# Patient Record
Sex: MALE | Race: WHITE | NOT HISPANIC OR LATINO
[De-identification: names, ages, dates, MRNs, and addresses within clinical notes are randomized per-mention and may not be internally consistent; named-entity substitution may affect disease eponyms.]

---

## 2019-01-09 ENCOUNTER — TRANSCRIBE ORDERS (OUTPATIENT)
Dept: SCHEDULING | Age: 64
End: 2019-01-09

## 2019-01-09 DIAGNOSIS — N18.30 CHRONIC KIDNEY DISEASE, STAGE III (MODERATE) (CMS/HCC): Primary | ICD-10-CM

## 2019-01-09 DIAGNOSIS — R35.0 FREQUENCY OF MICTURITION: ICD-10-CM

## 2019-01-10 ENCOUNTER — HOSPITAL ENCOUNTER (OUTPATIENT)
Dept: RADIOLOGY | Facility: CLINIC | Age: 64
Discharge: HOME | End: 2019-01-10
Attending: INTERNAL MEDICINE
Payer: COMMERCIAL

## 2019-01-10 DIAGNOSIS — N18.30 CHRONIC KIDNEY DISEASE, STAGE III (MODERATE) (CMS/HCC): ICD-10-CM

## 2019-01-10 DIAGNOSIS — R35.0 FREQUENCY OF MICTURITION: ICD-10-CM

## 2019-01-10 PROCEDURE — 76775 US EXAM ABDO BACK WALL LIM: CPT

## 2019-02-11 ENCOUNTER — TRANSCRIBE ORDERS (OUTPATIENT)
Dept: SCHEDULING | Age: 64
End: 2019-02-11

## 2019-02-11 DIAGNOSIS — N13.30 HYDRONEPHROSIS: Primary | ICD-10-CM

## 2019-02-25 ENCOUNTER — HOSPITAL ENCOUNTER (OUTPATIENT)
Dept: RADIOLOGY | Facility: CLINIC | Age: 64
Discharge: HOME | End: 2019-02-25
Attending: PHYSICIAN ASSISTANT
Payer: COMMERCIAL

## 2019-02-25 DIAGNOSIS — N13.30 HYDRONEPHROSIS: ICD-10-CM

## 2019-02-25 PROCEDURE — 76770 US EXAM ABDO BACK WALL COMP: CPT

## 2019-04-04 RX ORDER — ROSUVASTATIN CALCIUM 5 MG/1
5 TABLET, COATED ORAL NIGHTLY
COMMUNITY
End: 2019-04-04 | Stop reason: ENTERED-IN-ERROR

## 2019-04-04 RX ORDER — AMITRIPTYLINE HYDROCHLORIDE 50 MG/1
50 TABLET, FILM COATED ORAL NIGHTLY
COMMUNITY
End: 2019-04-04 | Stop reason: ENTERED-IN-ERROR

## 2019-04-04 RX ORDER — CYCLOBENZAPRINE HCL 10 MG
10 TABLET ORAL 3 TIMES DAILY PRN
COMMUNITY
End: 2019-04-04 | Stop reason: ENTERED-IN-ERROR

## 2019-04-04 RX ORDER — ALPRAZOLAM 0.25 MG/1
0.25 TABLET ORAL NIGHTLY PRN
COMMUNITY
End: 2019-04-04 | Stop reason: ENTERED-IN-ERROR

## 2019-04-04 RX ORDER — ACETAMINOPHEN 500 MG
2000 TABLET ORAL DAILY
COMMUNITY
End: 2019-04-04 | Stop reason: ENTERED-IN-ERROR

## 2019-04-04 RX ORDER — MONTELUKAST SODIUM 10 MG/1
TABLET ORAL NIGHTLY
COMMUNITY
End: 2019-04-04 | Stop reason: ENTERED-IN-ERROR

## 2019-04-09 ENCOUNTER — ANESTHESIA EVENT (OUTPATIENT)
Dept: OPERATING ROOM | Facility: HOSPITAL | Age: 64
Setting detail: HOSPITAL OUTPATIENT SURGERY
End: 2019-04-09
Payer: COMMERCIAL

## 2019-04-09 ENCOUNTER — HOSPITAL ENCOUNTER (OUTPATIENT)
Facility: HOSPITAL | Age: 64
Setting detail: HOSPITAL OUTPATIENT SURGERY
Discharge: HOME | End: 2019-04-09
Attending: UROLOGY | Admitting: UROLOGY
Payer: COMMERCIAL

## 2019-04-09 VITALS
TEMPERATURE: 97.9 F | HEART RATE: 85 BPM | SYSTOLIC BLOOD PRESSURE: 127 MMHG | DIASTOLIC BLOOD PRESSURE: 82 MMHG | OXYGEN SATURATION: 97 % | RESPIRATION RATE: 17 BRPM

## 2019-04-09 DIAGNOSIS — R97.20 ABNORMAL PSA: ICD-10-CM

## 2019-04-09 PROCEDURE — 27200000 HC STERILE SUPPLY: Performed by: UROLOGY

## 2019-04-09 PROCEDURE — 36000002 HC OR LEVEL 2 INITIAL 30MIN: Performed by: UROLOGY

## 2019-04-09 PROCEDURE — 88305 TISSUE EXAM BY PATHOLOGIST: CPT | Performed by: UROLOGY

## 2019-04-09 PROCEDURE — 71000002 HC PACU PHASE 2 INITIAL 30MIN: Performed by: UROLOGY

## 2019-04-09 PROCEDURE — 0VB07ZX EXCISION OF PROSTATE, VIA NATURAL OR ARTIFICIAL OPENING, DIAGNOSTIC: ICD-10-PCS | Performed by: UROLOGY

## 2019-04-09 PROCEDURE — 25800000 HC PHARMACY IV SOLUTIONS: Performed by: UROLOGY

## 2019-04-09 PROCEDURE — 71000012 HC PACU PHASE 2 EA ADDL MIN: Performed by: UROLOGY

## 2019-04-09 PROCEDURE — 25000000 HC PHARMACY GENERAL: Performed by: NURSE ANESTHETIST, CERTIFIED REGISTERED

## 2019-04-09 PROCEDURE — 63600000 HC DRUGS/DETAIL CODE: Mod: JW | Performed by: NURSE ANESTHETIST, CERTIFIED REGISTERED

## 2019-04-09 PROCEDURE — 0VB03ZX EXCISION OF PROSTATE, PERCUTANEOUS APPROACH, DIAGNOSTIC: ICD-10-PCS | Performed by: UROLOGY

## 2019-04-09 PROCEDURE — 63600000 HC DRUGS/DETAIL CODE: Performed by: UROLOGY

## 2019-04-09 PROCEDURE — 71000011 HC PACU PHASE 1 EA ADDL MIN: Performed by: UROLOGY

## 2019-04-09 PROCEDURE — 37000001 HC ANESTHESIA GENERAL: Performed by: UROLOGY

## 2019-04-09 PROCEDURE — 36000012 HC OR LEVEL 2 EA ADDL MIN: Performed by: UROLOGY

## 2019-04-09 PROCEDURE — 25000000 HC PHARMACY GENERAL: Performed by: UROLOGY

## 2019-04-09 PROCEDURE — 71000001 HC PACU PHASE 1 INITIAL 30MIN: Performed by: UROLOGY

## 2019-04-09 RX ORDER — FENTANYL CITRATE 50 UG/ML
50 INJECTION, SOLUTION INTRAMUSCULAR; INTRAVENOUS
Status: DISCONTINUED | OUTPATIENT
Start: 2019-04-09 | End: 2019-04-09 | Stop reason: HOSPADM

## 2019-04-09 RX ORDER — ONDANSETRON HYDROCHLORIDE 2 MG/ML
INJECTION, SOLUTION INTRAVENOUS AS NEEDED
Status: DISCONTINUED | OUTPATIENT
Start: 2019-04-09 | End: 2019-04-09 | Stop reason: SURG

## 2019-04-09 RX ORDER — FENTANYL CITRATE 50 UG/ML
INJECTION, SOLUTION INTRAMUSCULAR; INTRAVENOUS AS NEEDED
Status: DISCONTINUED | OUTPATIENT
Start: 2019-04-09 | End: 2019-04-09 | Stop reason: SURG

## 2019-04-09 RX ORDER — SODIUM CHLORIDE 9 MG/ML
INJECTION, SOLUTION INTRAVENOUS CONTINUOUS
Status: DISCONTINUED | OUTPATIENT
Start: 2019-04-09 | End: 2019-04-09 | Stop reason: HOSPADM

## 2019-04-09 RX ORDER — GLYCOPYRROLATE 0.6MG/3ML
SYRINGE (ML) INTRAVENOUS AS NEEDED
Status: DISCONTINUED | OUTPATIENT
Start: 2019-04-09 | End: 2019-04-09 | Stop reason: SURG

## 2019-04-09 RX ORDER — PROPOFOL 10 MG/ML
INJECTION, EMULSION INTRAVENOUS AS NEEDED
Status: DISCONTINUED | OUTPATIENT
Start: 2019-04-09 | End: 2019-04-09 | Stop reason: SURG

## 2019-04-09 RX ORDER — MIDAZOLAM HYDROCHLORIDE 2 MG/2ML
INJECTION, SOLUTION INTRAMUSCULAR; INTRAVENOUS AS NEEDED
Status: DISCONTINUED | OUTPATIENT
Start: 2019-04-09 | End: 2019-04-09 | Stop reason: SURG

## 2019-04-09 RX ORDER — HYDROMORPHONE HYDROCHLORIDE 1 MG/ML
.25-.5 INJECTION, SOLUTION INTRAMUSCULAR; INTRAVENOUS; SUBCUTANEOUS
Status: DISCONTINUED | OUTPATIENT
Start: 2019-04-09 | End: 2019-04-09 | Stop reason: HOSPADM

## 2019-04-09 RX ORDER — IBUPROFEN 200 MG
16-32 TABLET ORAL AS NEEDED
Status: DISCONTINUED | OUTPATIENT
Start: 2019-04-09 | End: 2019-04-09 | Stop reason: HOSPADM

## 2019-04-09 RX ORDER — LIDOCAINE HYDROCHLORIDE 10 MG/ML
INJECTION, SOLUTION INFILTRATION; PERINEURAL AS NEEDED
Status: DISCONTINUED | OUTPATIENT
Start: 2019-04-09 | End: 2019-04-09 | Stop reason: HOSPADM

## 2019-04-09 RX ORDER — DEXTROSE 40 %
15-30 GEL (GRAM) ORAL AS NEEDED
Status: DISCONTINUED | OUTPATIENT
Start: 2019-04-09 | End: 2019-04-09 | Stop reason: HOSPADM

## 2019-04-09 RX ORDER — DEXAMETHASONE SODIUM PHOSPHATE 4 MG/ML
INJECTION, SOLUTION INTRA-ARTICULAR; INTRALESIONAL; INTRAMUSCULAR; INTRAVENOUS; SOFT TISSUE AS NEEDED
Status: DISCONTINUED | OUTPATIENT
Start: 2019-04-09 | End: 2019-04-09 | Stop reason: SURG

## 2019-04-09 RX ORDER — EPHEDRINE SULFATE/0.9% NACL/PF 50 MG/5 ML
SYRINGE (ML) INTRAVENOUS AS NEEDED
Status: DISCONTINUED | OUTPATIENT
Start: 2019-04-09 | End: 2019-04-09 | Stop reason: SURG

## 2019-04-09 RX ORDER — ACETAMINOPHEN 325 MG/1
975 TABLET ORAL EVERY 6 HOURS
Status: DISCONTINUED | OUTPATIENT
Start: 2019-04-09 | End: 2019-04-09 | Stop reason: HOSPADM

## 2019-04-09 RX ORDER — OXYCODONE HYDROCHLORIDE 5 MG/1
5-10 TABLET ORAL EVERY 4 HOURS PRN
Status: DISCONTINUED | OUTPATIENT
Start: 2019-04-09 | End: 2019-04-09 | Stop reason: HOSPADM

## 2019-04-09 RX ORDER — DEXTROSE 50 % IN WATER (D50W) INTRAVENOUS SYRINGE
25 AS NEEDED
Status: DISCONTINUED | OUTPATIENT
Start: 2019-04-09 | End: 2019-04-09 | Stop reason: HOSPADM

## 2019-04-09 RX ORDER — PHENYLEPHRINE HCL IN 0.9% NACL 1 MG/10 ML
SYRINGE (ML) INTRAVENOUS AS NEEDED
Status: DISCONTINUED | OUTPATIENT
Start: 2019-04-09 | End: 2019-04-09 | Stop reason: SURG

## 2019-04-09 RX ORDER — ONDANSETRON HYDROCHLORIDE 2 MG/ML
4 INJECTION, SOLUTION INTRAVENOUS
Status: DISCONTINUED | OUTPATIENT
Start: 2019-04-09 | End: 2019-04-09 | Stop reason: HOSPADM

## 2019-04-09 RX ADMIN — SODIUM CHLORIDE: 9 INJECTION, SOLUTION INTRAVENOUS at 11:34

## 2019-04-09 RX ADMIN — DEXAMETHASONE SODIUM PHOSPHATE 4 MG: 4 INJECTION, SOLUTION INTRAMUSCULAR; INTRAVENOUS at 13:10

## 2019-04-09 RX ADMIN — PROPOFOL 30 MG: 10 INJECTION, EMULSION INTRAVENOUS at 13:15

## 2019-04-09 RX ADMIN — CEFTRIAXONE SODIUM 1 G: 2 INJECTION, POWDER, FOR SOLUTION INTRAMUSCULAR; INTRAVENOUS at 11:36

## 2019-04-09 RX ADMIN — Medication 200 MCG: at 13:30

## 2019-04-09 RX ADMIN — Medication 0.2 MG: at 12:59

## 2019-04-09 RX ADMIN — Medication 100 MCG: at 13:26

## 2019-04-09 RX ADMIN — MIDAZOLAM HYDROCHLORIDE 2 MG: 1 INJECTION, SOLUTION INTRAMUSCULAR; INTRAVENOUS at 13:00

## 2019-04-09 RX ADMIN — Medication 200 MCG: at 13:39

## 2019-04-09 RX ADMIN — Medication 10 MG: at 13:30

## 2019-04-09 RX ADMIN — ONDANSETRON 4 MG: 2 INJECTION INTRAMUSCULAR; INTRAVENOUS at 13:10

## 2019-04-09 RX ADMIN — FENTANYL CITRATE 50 MCG: 50 INJECTION, SOLUTION INTRAMUSCULAR; INTRAVENOUS at 13:03

## 2019-04-09 RX ADMIN — FENTANYL CITRATE 50 MCG: 50 INJECTION, SOLUTION INTRAMUSCULAR; INTRAVENOUS at 13:18

## 2019-04-09 RX ADMIN — PROPOFOL 200 MG: 10 INJECTION, EMULSION INTRAVENOUS at 13:03

## 2019-04-09 RX ADMIN — Medication 200 MCG: at 13:28

## 2019-04-09 ASSESSMENT — LIFESTYLE VARIABLES: TOBACCO_USE: 1

## 2019-04-09 NOTE — ANESTHESIA PROCEDURE NOTES
Airway  Urgency: elective    Start Time: 4/9/2019 1:06 PM  Airway not difficult    General Information and Staff    Patient location during procedure: OR  Anesthesiologist: ELVA PIPER  Resident/CRNA: NADIA ROSEN  Performed: resident/CRNA     Indications and Patient Condition  Indications for airway management: anesthesia  Sedation level: general  Preoxygenated: yes  Patient position: sniffing  MILS maintained throughout  Mask difficulty assessment: 1 - vent by mask    Final Airway Details  Final airway type: supraglottic airway (LMA)      Successful airway: classic  Size 5    Number of attempts at approach: 1

## 2019-04-09 NOTE — ANESTHESIA PREPROCEDURE EVALUATION
Anesthesia ROS/MED HX    Anesthesia History - neg  Pulmonary    history of tobacco use   Sleep apnea (snores, no sleep study)  Neuro/Psych - neg  Cardiovascular- neg   ECG reviewed  Hematological - neg  GI/Hepatic- neg  Musculoskeletal- neg  Renal Disease- neg  Endo/Other- neg  Body Habitus: Normal      Past Surgical History:   Procedure Laterality Date   • COLONOSCOPY     • TONSILLECTOMY     • WISDOM TOOTH EXTRACTION         Physical Exam    Airway   Mallampati: II   TM distance: >3 FB   Neck ROM: full  Cardiovascular - normal   Rhythm: regular   Rate: normal  Pulmonary - normal   clear to auscultation  Dental - normal        Anesthesia Plan    Plan: general    Technique: general LMA     Lines and Monitors: PIV     Airway: oral intubation     not instructed to abstain from smoking on day of procedure    Patient did not smoke on day of surgery  ASA 2  Blood Products:   Use of Blood Products Discussed: No   Anesthetic plan and risks discussed with: patient and spouse  Induction:    intravenous   Postop Plan:   Patient Disposition: phase II then home   Pain Management: IV analgesics

## 2019-04-09 NOTE — DISCHARGE INSTRUCTIONS
DISCHARGE INSTRUCTIONS - Transrectal ultrasound-guided prostate biopsy     ACTIVITY:  • You should be physically active and try to do a little more each day to build your stamina.  You may walk and climb stairs without limitations.  • You can go back to your normal activity starting tomorrow.  • Prolonged sitting or lying in bed should be avoided  DRIVING:  • You should not drive for 24 hours  PAIN:  • You can expect to have some mild discomfort after discharge. Tylenol should be sufficient to control your pain.  • Burning and bleeding with urination and bowel movements are common with this procedure. It will gradually become less intense after first few times you pass your urine.   MEDICATIONS:  • After surgery you should immediately resume your regular medications.  o ***  DIET AND BOWELS:  • Drink plenty of fluids during the day.  Water is the best, but juices, coffee, tea are all acceptable.  The purpose is to increase the urine output enough to flush out any blood.  • If needed, you may use over the counter stool softeners (such as miralax) to help your bowels get started. Do NOT strain.  WHEN TO CALL US:  • Temperature of 101.0 degrees or above (fevers <101 may be normal after surgery).  • Severe pain - not relieved with pain medication, especially if it is associated with nausea, vomiting, or dizziness  • Pain, burning or other difficulty urinating  • Unable to pass your urine  • Persistent bleeding  APPOINTMENTS:  • Call for an appointment as instructed. You will be called in a few days with pathology results. If you do not hear back within a week, please call the office.  • Interval appointments as needed

## 2019-04-09 NOTE — OP NOTE
Preoperative diagnosis: Elevated PSA  Postop diagnosis: Same  Procedure: Transrectal ultrasound, transperineal prostate biopsy  Surgeon: Eligio Oliva MD  Anesthesiologist: Kelly Rodriguez MD  Specimens: Ten samples were taken.  Posterior medial, posterior lateral, base, anterior medial, anterior lateral.  These were taken from the right and left side.  At least 2 cores were taken in each region.  Complications: None  Indication for operation: Patient is a 63-year-old gentleman who went into urinary retention.  Time of diagnosis, and after catheterization his PSA was 17.  He thought this might be artifactual because of his instrumentation.  However repeat PSA was 19, and subsequently repeat PSA was 22.  For this reason he is undergoing a prostate biopsy.  No prostate nodules are palpable on exam.  Description of procedure: Patient was brought to the operating room and underwent sedation.  He was placed in lithotomy position with due care to pad all pressure points.  Sequential compression boots were placed in the operating that time induction of general anesthesia.  Timeout was performed by the surgical team.  Rocephin had been given intravenously within the hour of starting the procedure.  Transrectal ultrasound was performed.  Prostate volume measurements were not taken.  Transperineal biopsies were performed with the use of the perinealogic needle guide.  1% lidocaine local anesthesia was injected in the subcutaneous tissues as well as to see around the pelvic floor and apex of the prostate.  Biopsies were taken in the areas described above in a standard fashion.  At the end of the procedure there is no evidence of hematoma around ultrasound.  There is no evidence of anything in the perineum other than to puncture points.  Patient tolerated procedure well and went to the recovery room in excellent condition.

## 2019-04-09 NOTE — PERIOPERATIVE NURSING NOTE
Pt OOB ambulating without dizziness, tolerating po fluids well. Is unable to void at present, denies any feeling of bladder pressure.

## 2019-04-09 NOTE — PERIOPERATIVE NURSING NOTE
Pt voided 100ml clear yellow urine. Post void residual scan shows > 900ml in bladder. Pt states he self caths 3 times a day at home. Dr. Ware advised, pt to cath and be discharged to home.

## 2019-04-09 NOTE — ANESTHESIA POSTPROCEDURE EVALUATION
Patient: Geoffrey Mclean    Procedure Summary     Date:  04/09/19 Room / Location:  [facility] OR 09 / [facility] OR [facility]    Anesthesia Start:  1259 Anesthesia Stop:  1400    Procedure:  Transperineal Prostate Bx (N/A ) Diagnosis:       Abnormal PSA      (Elevated PSA)    Surgeon:  Eligio Oliva MD Responsible Provider:  Kelly Rodriguez MD    Anesthesia Type:  general ASA Status:  2          Anesthesia Type: general  PACU Vitals  4/9/2019 1353 - 4/9/2019 1444      4/9/2019 1359 4/9/2019 1400 4/9/2019 1415 4/9/2019 1430    BP: - 118/71 113/63 117/68    Temp: 36.6 °C (97.9 °F) - - -    Pulse: - 98 91 87    Resp: - 12 17 16    SpO2: - 98 % 98 % 96 %            Anesthesia Post Evaluation    Pain management: adequate  Patient location during evaluation: PACU  Patient participation: complete - patient participated  Level of consciousness: awake and alert  Cardiovascular status: acceptable  Airway Patency: adequate  Respiratory status: acceptable  Hydration status: acceptable  Anesthetic complications: no

## 2019-04-16 LAB
CASE RPRT: NORMAL
CLINICAL INFO: NORMAL
PATH REPORT.FINAL DX SPEC: NORMAL
PATH REPORT.GROSS SPEC: NORMAL

## 2019-05-30 ENCOUNTER — ANESTHESIA EVENT (OUTPATIENT)
Dept: OPERATING ROOM | Facility: HOSPITAL | Age: 64
Setting detail: HOSPITAL OUTPATIENT SURGERY
End: 2019-05-30
Payer: COMMERCIAL

## 2019-05-31 ENCOUNTER — ANESTHESIA (OUTPATIENT)
Dept: OPERATING ROOM | Facility: HOSPITAL | Age: 64
Setting detail: HOSPITAL OUTPATIENT SURGERY
End: 2019-05-31
Payer: COMMERCIAL

## 2019-05-31 ENCOUNTER — HOSPITAL ENCOUNTER (OUTPATIENT)
Facility: HOSPITAL | Age: 64
Discharge: HOME | End: 2019-06-01
Attending: UROLOGY | Admitting: UROLOGY
Payer: COMMERCIAL

## 2019-05-31 DIAGNOSIS — N13.8 BENIGN LOCALIZED HYPERPLASIA OF PROSTATE WITH URINARY OBSTRUCTION: ICD-10-CM

## 2019-05-31 DIAGNOSIS — N40.1 BENIGN LOCALIZED HYPERPLASIA OF PROSTATE WITH URINARY OBSTRUCTION: ICD-10-CM

## 2019-05-31 PROCEDURE — 25000000 HC PHARMACY GENERAL: Performed by: NURSE ANESTHETIST, CERTIFIED REGISTERED

## 2019-05-31 PROCEDURE — 63600000 HC DRUGS/DETAIL CODE: Performed by: UROLOGY

## 2019-05-31 PROCEDURE — 63600000 HC DRUGS/DETAIL CODE: Performed by: NURSE ANESTHETIST, CERTIFIED REGISTERED

## 2019-05-31 PROCEDURE — 25800000 HC PHARMACY IV SOLUTIONS: Performed by: SPECIALIST

## 2019-05-31 PROCEDURE — 63700000 HC SELF-ADMINISTRABLE DRUG: Performed by: SPECIALIST

## 2019-05-31 PROCEDURE — 0VT08ZZ RESECTION OF PROSTATE, VIA NATURAL OR ARTIFICIAL OPENING ENDOSCOPIC: ICD-10-PCS | Performed by: UROLOGY

## 2019-05-31 PROCEDURE — 27200000 HC STERILE SUPPLY: Performed by: UROLOGY

## 2019-05-31 PROCEDURE — 71000001 HC PACU PHASE 1 INITIAL 30MIN: Performed by: UROLOGY

## 2019-05-31 PROCEDURE — 25800000 HC PHARMACY IV SOLUTIONS: Performed by: UROLOGY

## 2019-05-31 PROCEDURE — 36000002 HC OR LEVEL 2 INITIAL 30MIN: Performed by: UROLOGY

## 2019-05-31 PROCEDURE — 36000012 HC OR LEVEL 2 EA ADDL MIN: Performed by: UROLOGY

## 2019-05-31 PROCEDURE — 37000001 HC ANESTHESIA GENERAL: Performed by: UROLOGY

## 2019-05-31 PROCEDURE — 88305 TISSUE EXAM BY PATHOLOGIST: CPT | Performed by: UROLOGY

## 2019-05-31 PROCEDURE — 71000011 HC PACU PHASE 1 EA ADDL MIN: Performed by: UROLOGY

## 2019-05-31 PROCEDURE — 25800000 HC PHARMACY IV SOLUTIONS: Performed by: NURSE ANESTHETIST, CERTIFIED REGISTERED

## 2019-05-31 RX ORDER — HYDROMORPHONE HYDROCHLORIDE 1 MG/ML
0.5 INJECTION, SOLUTION INTRAMUSCULAR; INTRAVENOUS; SUBCUTANEOUS
Status: DISCONTINUED | OUTPATIENT
Start: 2019-05-31 | End: 2019-05-31 | Stop reason: HOSPADM

## 2019-05-31 RX ORDER — FENTANYL CITRATE 50 UG/ML
50 INJECTION, SOLUTION INTRAMUSCULAR; INTRAVENOUS
Status: DISCONTINUED | OUTPATIENT
Start: 2019-05-31 | End: 2019-05-31 | Stop reason: HOSPADM

## 2019-05-31 RX ORDER — ESMOLOL HYDROCHLORIDE 10 MG/ML
INJECTION INTRAVENOUS AS NEEDED
Status: DISCONTINUED | OUTPATIENT
Start: 2019-05-31 | End: 2019-05-31 | Stop reason: SURG

## 2019-05-31 RX ORDER — OXYCODONE HYDROCHLORIDE 5 MG/1
5-10 TABLET ORAL EVERY 4 HOURS PRN
Status: DISCONTINUED | OUTPATIENT
Start: 2019-05-31 | End: 2019-06-01 | Stop reason: HOSPADM

## 2019-05-31 RX ORDER — DOCUSATE SODIUM 100 MG/1
100 CAPSULE, LIQUID FILLED ORAL 2 TIMES DAILY
Status: DISCONTINUED | OUTPATIENT
Start: 2019-05-31 | End: 2019-06-01 | Stop reason: HOSPADM

## 2019-05-31 RX ORDER — FINASTERIDE 5 MG/1
5 TABLET, FILM COATED ORAL DAILY
Status: DISCONTINUED | OUTPATIENT
Start: 2019-05-31 | End: 2019-06-01 | Stop reason: HOSPADM

## 2019-05-31 RX ORDER — ONDANSETRON HYDROCHLORIDE 2 MG/ML
4 INJECTION, SOLUTION INTRAVENOUS EVERY 8 HOURS PRN
Status: DISCONTINUED | OUTPATIENT
Start: 2019-05-31 | End: 2019-06-01 | Stop reason: HOSPADM

## 2019-05-31 RX ORDER — DEXTROSE 50 % IN WATER (D50W) INTRAVENOUS SYRINGE
25 AS NEEDED
Status: DISCONTINUED | OUTPATIENT
Start: 2019-05-31 | End: 2019-06-01 | Stop reason: HOSPADM

## 2019-05-31 RX ORDER — ACETAMINOPHEN 325 MG/1
975 TABLET ORAL EVERY 6 HOURS
Status: DISCONTINUED | OUTPATIENT
Start: 2019-05-31 | End: 2019-06-01 | Stop reason: HOSPADM

## 2019-05-31 RX ORDER — DIPHENHYDRAMINE HCL 25 MG
25 CAPSULE ORAL EVERY 6 HOURS PRN
Status: DISCONTINUED | OUTPATIENT
Start: 2019-05-31 | End: 2019-06-01 | Stop reason: HOSPADM

## 2019-05-31 RX ORDER — FENTANYL CITRATE 50 UG/ML
INJECTION, SOLUTION INTRAMUSCULAR; INTRAVENOUS AS NEEDED
Status: DISCONTINUED | OUTPATIENT
Start: 2019-05-31 | End: 2019-05-31 | Stop reason: SURG

## 2019-05-31 RX ORDER — SENNOSIDES 8.6 MG/1
1 TABLET ORAL 2 TIMES DAILY
Status: DISCONTINUED | OUTPATIENT
Start: 2019-05-31 | End: 2019-06-01 | Stop reason: HOSPADM

## 2019-05-31 RX ORDER — PROPOFOL 10 MG/ML
INJECTION, EMULSION INTRAVENOUS AS NEEDED
Status: DISCONTINUED | OUTPATIENT
Start: 2019-05-31 | End: 2019-05-31 | Stop reason: SURG

## 2019-05-31 RX ORDER — MIDAZOLAM HYDROCHLORIDE 2 MG/2ML
INJECTION, SOLUTION INTRAMUSCULAR; INTRAVENOUS AS NEEDED
Status: DISCONTINUED | OUTPATIENT
Start: 2019-05-31 | End: 2019-05-31 | Stop reason: SURG

## 2019-05-31 RX ORDER — HYDROMORPHONE HYDROCHLORIDE 1 MG/ML
.25-.5 INJECTION, SOLUTION INTRAMUSCULAR; INTRAVENOUS; SUBCUTANEOUS
Status: DISCONTINUED | OUTPATIENT
Start: 2019-05-31 | End: 2019-06-01 | Stop reason: HOSPADM

## 2019-05-31 RX ORDER — DEXAMETHASONE SODIUM PHOSPHATE 4 MG/ML
INJECTION, SOLUTION INTRA-ARTICULAR; INTRALESIONAL; INTRAMUSCULAR; INTRAVENOUS; SOFT TISSUE AS NEEDED
Status: DISCONTINUED | OUTPATIENT
Start: 2019-05-31 | End: 2019-05-31 | Stop reason: SURG

## 2019-05-31 RX ORDER — CIPROFLOXACIN 500 MG/1
500 TABLET ORAL 2 TIMES DAILY
Qty: 2 TABLET | Refills: 0 | Status: SHIPPED | OUTPATIENT
Start: 2019-05-31 | End: 2019-06-07

## 2019-05-31 RX ORDER — POLYETHYLENE GLYCOL 3350 17 G/17G
17 POWDER, FOR SOLUTION ORAL DAILY
Status: DISCONTINUED | OUTPATIENT
Start: 2019-05-31 | End: 2019-06-01 | Stop reason: HOSPADM

## 2019-05-31 RX ORDER — EPHEDRINE SULFATE/0.9% NACL/PF 50 MG/5 ML
SYRINGE (ML) INTRAVENOUS AS NEEDED
Status: DISCONTINUED | OUTPATIENT
Start: 2019-05-31 | End: 2019-05-31 | Stop reason: SURG

## 2019-05-31 RX ORDER — OXYBUTYNIN CHLORIDE 10 MG/1
10 TABLET, EXTENDED RELEASE ORAL NIGHTLY
Status: DISCONTINUED | OUTPATIENT
Start: 2019-05-31 | End: 2019-06-01 | Stop reason: HOSPADM

## 2019-05-31 RX ORDER — SODIUM CHLORIDE, SODIUM GLUCONATE, SODIUM ACETATE, POTASSIUM CHLORIDE AND MAGNESIUM CHLORIDE 30; 37; 368; 526; 502 MG/100ML; MG/100ML; MG/100ML; MG/100ML; MG/100ML
INJECTION, SOLUTION INTRAVENOUS CONTINUOUS PRN
Status: DISCONTINUED | OUTPATIENT
Start: 2019-05-31 | End: 2019-05-31 | Stop reason: SURG

## 2019-05-31 RX ORDER — LIDOCAINE HYDROCHLORIDE 10 MG/ML
INJECTION, SOLUTION EPIDURAL; INFILTRATION; INTRACAUDAL; PERINEURAL AS NEEDED
Status: DISCONTINUED | OUTPATIENT
Start: 2019-05-31 | End: 2019-05-31 | Stop reason: SURG

## 2019-05-31 RX ORDER — IBUPROFEN 200 MG
16-32 TABLET ORAL AS NEEDED
Status: DISCONTINUED | OUTPATIENT
Start: 2019-05-31 | End: 2019-06-01 | Stop reason: HOSPADM

## 2019-05-31 RX ORDER — DIPHENHYDRAMINE HCL 50 MG/ML
25 VIAL (ML) INJECTION EVERY 6 HOURS PRN
Status: DISCONTINUED | OUTPATIENT
Start: 2019-05-31 | End: 2019-06-01 | Stop reason: HOSPADM

## 2019-05-31 RX ORDER — SODIUM CHLORIDE 9 MG/ML
INJECTION, SOLUTION INTRAVENOUS CONTINUOUS
Status: DISCONTINUED | OUTPATIENT
Start: 2019-05-31 | End: 2019-06-01 | Stop reason: HOSPADM

## 2019-05-31 RX ORDER — ATROPA BELLADONNA AND OPIUM 16.2; 6 MG/1; MG/1
1 SUPPOSITORY RECTAL EVERY 8 HOURS PRN
Status: DISCONTINUED | OUTPATIENT
Start: 2019-05-31 | End: 2019-06-01 | Stop reason: HOSPADM

## 2019-05-31 RX ORDER — ONDANSETRON HYDROCHLORIDE 2 MG/ML
INJECTION, SOLUTION INTRAVENOUS AS NEEDED
Status: DISCONTINUED | OUTPATIENT
Start: 2019-05-31 | End: 2019-05-31 | Stop reason: SURG

## 2019-05-31 RX ORDER — DEXTROSE 40 %
15-30 GEL (GRAM) ORAL AS NEEDED
Status: DISCONTINUED | OUTPATIENT
Start: 2019-05-31 | End: 2019-06-01 | Stop reason: HOSPADM

## 2019-05-31 RX ORDER — ONDANSETRON 4 MG/1
4 TABLET, ORALLY DISINTEGRATING ORAL EVERY 8 HOURS PRN
Status: DISCONTINUED | OUTPATIENT
Start: 2019-05-31 | End: 2019-06-01 | Stop reason: HOSPADM

## 2019-05-31 RX ADMIN — SODIUM CHLORIDE, SODIUM GLUCONATE, SODIUM ACETATE, POTASSIUM CHLORIDE AND MAGNESIUM CHLORIDE: 526; 502; 368; 37; 30 INJECTION, SOLUTION INTRAVENOUS at 09:59

## 2019-05-31 RX ADMIN — SENNOSIDES 1 TABLET: 8.6 TABLET, FILM COATED ORAL at 20:23

## 2019-05-31 RX ADMIN — DEXAMETHASONE SODIUM PHOSPHATE 4 MG: 4 INJECTION, SOLUTION INTRAMUSCULAR; INTRAVENOUS at 08:41

## 2019-05-31 RX ADMIN — ESMOLOL HYDROCHLORIDE 30 MG: 10 INJECTION, SOLUTION INTRAVENOUS at 09:54

## 2019-05-31 RX ADMIN — PROPOFOL 200 MG: 10 INJECTION, EMULSION INTRAVENOUS at 08:24

## 2019-05-31 RX ADMIN — Medication 3 ML: at 08:24

## 2019-05-31 RX ADMIN — FENTANYL CITRATE 25 MCG: 50 INJECTION, SOLUTION INTRAMUSCULAR; INTRAVENOUS at 11:11

## 2019-05-31 RX ADMIN — ACETAMINOPHEN 975 MG: 325 TABLET, FILM COATED ORAL at 17:33

## 2019-05-31 RX ADMIN — FENTANYL CITRATE 25 MCG: 50 INJECTION, SOLUTION INTRAMUSCULAR; INTRAVENOUS at 08:29

## 2019-05-31 RX ADMIN — DOCUSATE SODIUM 100 MG: 100 CAPSULE, LIQUID FILLED ORAL at 20:23

## 2019-05-31 RX ADMIN — Medication 5 MG: at 09:02

## 2019-05-31 RX ADMIN — SODIUM CHLORIDE: 900 INJECTION, SOLUTION INTRAVENOUS at 08:17

## 2019-05-31 RX ADMIN — MIDAZOLAM HYDROCHLORIDE 2 MG: 1 INJECTION, SOLUTION INTRAMUSCULAR; INTRAVENOUS at 08:18

## 2019-05-31 RX ADMIN — ONDANSETRON 4 MG: 2 INJECTION INTRAMUSCULAR; INTRAVENOUS at 08:41

## 2019-05-31 RX ADMIN — FENTANYL CITRATE 50 MCG: 50 INJECTION, SOLUTION INTRAMUSCULAR; INTRAVENOUS at 08:49

## 2019-05-31 RX ADMIN — SODIUM CHLORIDE: 9 INJECTION, SOLUTION INTRAVENOUS at 13:19

## 2019-05-31 RX ADMIN — FENTANYL CITRATE 50 MCG: 50 INJECTION, SOLUTION INTRAMUSCULAR; INTRAVENOUS at 08:24

## 2019-05-31 RX ADMIN — FENTANYL CITRATE 25 MCG: 50 INJECTION, SOLUTION INTRAMUSCULAR; INTRAVENOUS at 09:54

## 2019-05-31 RX ADMIN — ACETAMINOPHEN 975 MG: 325 TABLET, FILM COATED ORAL at 13:18

## 2019-05-31 RX ADMIN — OXYBUTYNIN CHLORIDE 10 MG: 10 TABLET, EXTENDED RELEASE ORAL at 13:19

## 2019-05-31 RX ADMIN — FENTANYL CITRATE 25 MCG: 50 INJECTION, SOLUTION INTRAMUSCULAR; INTRAVENOUS at 10:29

## 2019-05-31 RX ADMIN — CEFTRIAXONE SODIUM 1 G: 2 INJECTION, POWDER, FOR SOLUTION INTRAMUSCULAR; INTRAVENOUS at 07:46

## 2019-05-31 RX ADMIN — FENTANYL CITRATE 25 MCG: 50 INJECTION, SOLUTION INTRAMUSCULAR; INTRAVENOUS at 08:34

## 2019-05-31 RX ADMIN — POLYETHYLENE GLYCOL 3350 17 G: 17 POWDER, FOR SOLUTION ORAL at 13:19

## 2019-05-31 NOTE — DISCHARGE INSTRUCTIONS
DISCHARGE INSTRUCTIONS - CYSTOSCOPIC PROCEDURE w/ BEAULIEU PLACEMENT     ACTIVITY:  • You should be physically active and try to do a little more each day to build your stamina.  You may walk and climb stairs without limitations.  • You may not lift more than 15 pounds, or do any vigorous physical activity for 1 week (running, swimming, gym, golf, tennis, etc.).    • Prolonged sitting or lying in bed should be avoided  DRIVING:  • You should not drive if taking narcotic pain killers  PAIN:  • You can expect to have some pain for a few days after discharge. Tylenol should be sufficient to control your pain.  • Burning and bleeding with urination are common after this procedure.  It will gradually become less intense after first few times you pass your urine.  MEDICATIONS:  • After surgery you should immediately resume your regular medications.  o If you regularly take aspirin, you may resume it in 1 week.   • You may receive the following prescriptions:  o An antibiotic  o A stool softener  DIET AND BOWELS:  • Drink plenty of fluids during the day.  Water is the best, but juices, coffee, tea are all acceptable.  The purpose is to increase the urine output enough to flush out any blood.  • If needed, you may use over the counter stool softener (such as miralax) to help your bowels get started.     CATHETER CARE:  • You will be discharged with a catheter, a leg bag and a large (overnight) drainage bag.    • Always keep the bag below the level of the bladder - the catheter drains by gravity.  Always make sure the catheter is not being pulled on - we don’t want the catheter to be removed accidentally.  • Blood in the urine is common and can be seen for a few weeks after surgery.  If you do see blood in the urine, please drink plenty of water (to flush it out) and limit your activity slightly until the catheter clears up.  • Leaking around the catheter is not unusual.  It commonly happens during a bowel movement or with  bladder spasms.  Bladder spasms cause a sudden sensation that you need to urinate, crampy pain and a sudden flow of urine into and around the catheter.  Bladder spasms are uncomfortable, but harmless.  • You may take the catheter and bag into the shower with you.  Any material on the outside of the catheter can be washed off - please be sure to rinse any soap off completely to avoid irritation.     WHEN TO CALL US:  • Temperature of 101.0 degrees or above (fevers <101 may be normal after surgery).  • Severe pain - not relieved with pain medication, especially if it is associated with nausea, vomiting, or dizziness  • If your catheter stops draining for several hours.  • If you have significant bleeding in the catheter with blood clots.     APPOINTMENTS:  • Call [phone] ASAP after discharge to schedule appointment with Eligio Oliva MD (urology) on Tuesday 06/04/19  • Interval appointments as needed

## 2019-05-31 NOTE — OP NOTE
Cystourethroscopy, transurethral resection of the prostate procedure note     Preoperative Diagnosis:   1.  Elevated PSA  2.  Benign prostatic hyperplasia  3.  Urinary retention      Postoperative Diagnosis:   1.  Elevated PSA  2.  Benign prostatic hyperplasia  3.  Urinary retention    Surgeon: Eligio Oliva MD     Assistants: Elijah Cramer MD     Anesthesia: General LMA anesthesia    Antibiotic(s): Ceftriaxone IV    Complications:  None           Drains: 24F 3-way jose to NSS CBI with 60cc sterile water in jose balloon    Specimens: prostatic tissue    Estimated Blood Loss:  100 mL    Findings:   1.  On cystourethroscopy, the prostate is severely enlarged causing visual obstruction.  2.  No evidence of bladder stone or tumor.  The bladder is trabeculated.  3.  Bilateral ureteral orifices are in normal orthotopic position with normal efflux.  4.  At the completion of transurethral resection of the prostate, we confirm that bilateral ureteral orifices, the verumontanum, and the external urinary sphincter are intact.        Disposition: PACU - hemodynamically stable.    Indications of Procedure: Patient is a pleasant 63 years old gentleman with history of elevated PSA up to 22.5 on February 27, 2019.  He also have urinary retentions and history of obstructive uropathy.  He is currently on intermittent catheterization 3 times a day.  He underwent a transperineal prostate biopsy on April 9, 2019 which revealed benign prostatic tissue with no evidence of cancer.  He now presented for transurethral resection of the prostate for definitive treatment of BPH and urinary retention.  Risk, benefits, and alternatives to the procedure were discussed with the patient.  He agreed to proceed with the above procedure.    Details of Procedure: Patient was identified by name and medical record number in the holding area.  Informed consent was confirmed in the chart.  Patient was transported to the operating room and placed  supine on the operative table.  Bilateral lower extremity compression devices were applied and in good working order.  Perioperative antibiotics were administered.  General LMA anesthesia was administered.  Patient was then positioned in dorsal lithotomy with care to keep joints in neutral position, pad all pressure points, and bony prominences.  Patient was prepped with Betadine and draped in sterile fashion.  Surgical timeout was performed and all parties were in agreement.    We began with a systematic cystourethroscopy with a 22 [place] rigid cystoscope and 30 and 70 degree lenses.  The bladder is free of any stone or tumor.  It is trabeculated.  Bilateral ureteral orifices are in normal orthotopic position with normal efflux.  The prostate is markedly enlarged trilobar hyperplasia.     We then exchanged the rigid cystoscope for a 26 [place] continuous flow resectoscope over a visualizing obturator.  Using bipolar electrocautery, we resected the prostate systematically.  We began with the right lateral lobe starting from the anterior aspect to posterior aspect and from the bladder neck to the apical tissue just proximal to the level of the verumontanum.  We carried the resection down to the level of the prostatic capsule without violating the capsule.  We repeat the same procedure on the left lateral lobe.  Finally we flattened out the median lobe.  We then evacuated all prostatic tissues from the bladder and the prostatic fossa.  We fulgurated the prostatic fossa to achieve satisfactory hemostasis.  At the completion of the procedure, we inspected the bladder and prostate carefully to ensure that bilateral ureteral orifices, the verumontanum, and external urinary sphincter are intact.  We removed the resectoscope atraumatically.  We placed a 24 [place] three-way Key catheter with 60 cc of sterile water and the catheter balloon.  We hand irrigated the catheter to ensure that there was no blood clot.  We connected  the catheter to sterile saline continuous bladder irrigation.    At the completion of the procedure, the patient was cleaned and put back to supine position. Patient was transported to PACU in stable condition, having tolerated the procedure well.    Eligio Oliva MD was present and performed all critical portions of this procedure.    Supervisory note:  I performed the procedure with the assistance of Dr Cramer.  Complete TURP performed.  Ureteral orifices, veru, and sphincter intact at the end of the procedure.  All arterial bleeding controlled. 2 venous sinuses noted, and cautery insufficient to close.  Plan catheter for 4 days.

## 2019-06-01 VITALS
OXYGEN SATURATION: 100 % | TEMPERATURE: 97.6 F | DIASTOLIC BLOOD PRESSURE: 61 MMHG | HEART RATE: 59 BPM | RESPIRATION RATE: 18 BRPM | WEIGHT: 193.72 LBS | HEIGHT: 72 IN | BODY MASS INDEX: 26.24 KG/M2 | SYSTOLIC BLOOD PRESSURE: 109 MMHG

## 2019-06-01 PROCEDURE — 63600000 HC DRUGS/DETAIL CODE: Performed by: SPECIALIST

## 2019-06-01 PROCEDURE — 63700000 HC SELF-ADMINISTRABLE DRUG: Performed by: SPECIALIST

## 2019-06-01 RX ADMIN — FINASTERIDE 5 MG: 5 TABLET, FILM COATED ORAL at 08:04

## 2019-06-01 RX ADMIN — ACETAMINOPHEN 975 MG: 325 TABLET, FILM COATED ORAL at 01:01

## 2019-06-01 RX ADMIN — POLYETHYLENE GLYCOL 3350 17 G: 17 POWDER, FOR SOLUTION ORAL at 08:04

## 2019-06-01 RX ADMIN — DOCUSATE SODIUM 100 MG: 100 CAPSULE, LIQUID FILLED ORAL at 08:04

## 2019-06-01 RX ADMIN — CEFTRIAXONE SODIUM 1 G: 1 INJECTION, POWDER, FOR SOLUTION INTRAVENOUS at 08:02

## 2019-06-01 RX ADMIN — SENNOSIDES 1 TABLET: 8.6 TABLET, FILM COATED ORAL at 08:04

## 2019-06-01 RX ADMIN — ACETAMINOPHEN 975 MG: 325 TABLET, FILM COATED ORAL at 05:41

## 2019-06-01 NOTE — PROGRESS NOTES
Urology daily progress note    Subjective   No acute event overnight. Patient tolerated jose + CBI    Objective   Temp:  [36.2 °C (97.2 °F)-36.8 °C (98.3 °F)] 36.4 °C (97.6 °F)  Heart Rate:  [58-92] 58  Resp:  [16-20] 18  BP: ()/(51-80) 110/63   I/O last 3 completed shifts:  In: 1500 [I.V.:1500]  Out: 3600 [Urine:3500; Blood:100]  I/O this shift:  In: -   Out: 450 [Urine:450]    Physical exam  General: well appearing  HEENT: normochephalic  Respiratory: normal effort  Cardiovascular: regular rate  Abdomen: soft, non-tender, non-distended  : 24F 3-way jose in place to very slow CBI, urine is clear yellow. I clamped CBI this morning  Extremities: warm, well perfused, no edema  Neuro: intact  Psych: normal affect    Assessment/Plan   63 y.o. male POD 1 s/p TURP    -Clamp CBI  -Will take out 30cc of water from jose balloon  -If urine remains clear yellow off of CBI, will discharge to home with jose today  -F/u Tuesday for void trial  -Continue prophylactic abx - ceftriaxone here, cipro at discharge  -Bowel regimen  -Pain control  -Home medications as appropriate   -DVT prophylaxis  -Ambulate  -Encourage IS    Elijah Cramer, PGY5  [facility]  Pager #[phone]    I have seen and examined the pt and agree with the note-MS

## 2019-06-01 NOTE — PLAN OF CARE
Problem: Patient Care Overview (Adult)  Goal: Plan of Care Review  Outcome: Ongoing (interventions implemented as appropriate)   05/31/19 7933   Coping/Psychosocial   Plan Of Care Reviewed With patient   Plan of Care Review   Progress improving   Outcome Summary Pt AAOx3. Denies pain. CBI infusing slow. Tolerating clear liquid diet well.      Goal: Individualization & Mutuality  Outcome: Ongoing (interventions implemented as appropriate)      Problem: Pain, Acute (Adult)  Goal: Identify Related Risk Factors and Signs and Symptoms  Outcome: Ongoing (interventions implemented as appropriate)    Goal: Acceptable Pain Control/Comfort Level  Outcome: Ongoing (interventions implemented as appropriate)

## 2021-04-01 DIAGNOSIS — Z23 ENCOUNTER FOR IMMUNIZATION: ICD-10-CM

## 2021-04-05 ENCOUNTER — IMMUNIZATIONS (OUTPATIENT)
Dept: FAMILY MEDICINE CLINIC | Facility: HOSPITAL | Age: 66
End: 2021-04-05

## 2021-04-05 DIAGNOSIS — Z23 ENCOUNTER FOR IMMUNIZATION: Primary | ICD-10-CM

## 2021-04-05 PROCEDURE — 91301 SARS-COV-2 / COVID-19 MRNA VACCINE (MODERNA) 100 MCG: CPT

## 2021-04-05 PROCEDURE — 0011A SARS-COV-2 / COVID-19 MRNA VACCINE (MODERNA) 100 MCG: CPT

## 2021-05-05 ENCOUNTER — IMMUNIZATIONS (OUTPATIENT)
Dept: FAMILY MEDICINE CLINIC | Facility: HOSPITAL | Age: 66
End: 2021-05-05

## 2021-05-05 DIAGNOSIS — Z23 ENCOUNTER FOR IMMUNIZATION: Primary | ICD-10-CM

## 2021-05-05 PROCEDURE — 91301 SARS-COV-2 / COVID-19 MRNA VACCINE (MODERNA) 100 MCG: CPT

## 2021-05-05 PROCEDURE — 0012A SARS-COV-2 / COVID-19 MRNA VACCINE (MODERNA) 100 MCG: CPT

## 2022-02-02 ENCOUNTER — TRANSCRIBE ORDERS (OUTPATIENT)
Dept: RADIOLOGY | Age: 67
End: 2022-02-02

## 2022-02-02 ENCOUNTER — HOSPITAL ENCOUNTER (OUTPATIENT)
Dept: RADIOLOGY | Age: 67
Discharge: HOME | End: 2022-02-02
Attending: INTERNAL MEDICINE
Payer: COMMERCIAL

## 2022-02-02 DIAGNOSIS — M25.562 PAIN IN LEFT KNEE: ICD-10-CM

## 2022-02-02 DIAGNOSIS — M25.562 PAIN IN LEFT KNEE: Primary | ICD-10-CM

## 2022-02-02 PROCEDURE — 73564 X-RAY EXAM KNEE 4 OR MORE: CPT | Mod: LT

## (undated) DEVICE — PACK CYSTOSCOPY IV

## (undated) DEVICE — DRAINBAG URO TABLE W/12FT HOSE NON-STERILE

## (undated) DEVICE — PAD GROUND ELECTROSURGICAL W/CORD

## (undated) DEVICE — ***USE 57698*** SLEEVE FLOWTRON DVT CALF SINGLE USE

## (undated) DEVICE — Device

## (undated) DEVICE — COVER BACK TABLE ZONE-REINFORC

## (undated) DEVICE — COVER BACK TABLE REINFORCED

## (undated) DEVICE — BIPOLAR CUTTING LOOP

## (undated) DEVICE — GLOVE SZ 7.5 PROTEXIS CLASSIC LATEX

## (undated) DEVICE — ***USE 124736***SYRINGE TOOMEY

## (undated) DEVICE — OINTMENT SURGILUBE 4OZ TUBE

## (undated) DEVICE — SYRINGE DISP LUER-LOK 30 CC

## (undated) DEVICE — SOLN IRRIG .9%SOD 500ML

## (undated) DEVICE — HEEL  ELBOW PROTECTOR

## (undated) DEVICE — TUBING CYSTO BLADDER IRRIG

## (undated) DEVICE — SET IRRIGATION LEVEL I CYSTO

## (undated) DEVICE — SET TUBING SUCTION FOR ENDOMAT LC

## (undated) DEVICE — SOLN IRRIG STER WATER 500ML

## (undated) DEVICE — BAG URINARY DRAINAGE 4 LITER

## (undated) DEVICE — GOWN SURGICAL REINFORCED X-LAR

## (undated) DEVICE — TUBING CYSTO TUR IRRIG

## (undated) DEVICE — NEEDLE BIOPSY MAXCORE 18GX20CM

## (undated) DEVICE — PAD FOAM BIOPSY

## (undated) DEVICE — MARKER SURGICAL SKIN

## (undated) DEVICE — SOLN IRRIG .9%SOD 3L

## (undated) DEVICE — CONTAINER SPECIMEN STERILE 5OZ